# Patient Record
Sex: FEMALE | Race: WHITE
[De-identification: names, ages, dates, MRNs, and addresses within clinical notes are randomized per-mention and may not be internally consistent; named-entity substitution may affect disease eponyms.]

---

## 2017-03-03 ENCOUNTER — HOSPITAL ENCOUNTER (OUTPATIENT)
Dept: HOSPITAL 19 - ZLAB.WCH | Age: 27
End: 2017-03-03

## 2017-03-03 DIAGNOSIS — Z01.89: Primary | ICD-10-CM

## 2017-03-04 ENCOUNTER — HOSPITAL ENCOUNTER (EMERGENCY)
Dept: HOSPITAL 19 - COL.ER | Age: 27
Discharge: HOME | End: 2017-03-04
Payer: OTHER GOVERNMENT

## 2017-03-04 VITALS — TEMPERATURE: 98.2 F | HEART RATE: 107 BPM | SYSTOLIC BLOOD PRESSURE: 119 MMHG | DIASTOLIC BLOOD PRESSURE: 83 MMHG

## 2017-03-04 VITALS — HEIGHT: 60 IN | WEIGHT: 112.22 LBS | BODY MASS INDEX: 22.03 KG/M2

## 2017-03-04 DIAGNOSIS — R33.9: ICD-10-CM

## 2017-03-04 DIAGNOSIS — T83.021A: Primary | ICD-10-CM

## 2017-04-15 ENCOUNTER — HOSPITAL ENCOUNTER (EMERGENCY)
Dept: HOSPITAL 19 - COL.ER | Age: 27
LOS: 1 days | Discharge: HOME | End: 2017-04-16
Payer: OTHER GOVERNMENT

## 2017-04-15 VITALS — BODY MASS INDEX: 18.66 KG/M2 | WEIGHT: 111.99 LBS | HEIGHT: 65 IN

## 2017-04-15 VITALS — TEMPERATURE: 98.6 F

## 2017-04-15 DIAGNOSIS — R10.13: Primary | ICD-10-CM

## 2017-04-15 DIAGNOSIS — R11.2: ICD-10-CM

## 2017-04-15 DIAGNOSIS — R63.0: ICD-10-CM

## 2017-04-15 PROCEDURE — C9113 INJ PANTOPRAZOLE SODIUM, VIA: HCPCS

## 2017-04-16 VITALS — DIASTOLIC BLOOD PRESSURE: 72 MMHG | HEART RATE: 87 BPM | SYSTOLIC BLOOD PRESSURE: 115 MMHG

## 2017-04-16 LAB
ADJUSTED CALCIUM: 9.2 MG/DL (ref 8.4–10.2)
ALBUMIN SERPL-MCNC: 4.8 GM/DL (ref 3.5–5)
ALP SERPL-CCNC: 102 U/L (ref 50–136)
ALT SERPL-CCNC: 24 U/L (ref 9–52)
ANION GAP SERPL CALC-SCNC: 13 MMOL/L (ref 7–16)
BASOPHILS # BLD: 0.1 10*3/UL (ref 0–0.2)
BASOPHILS NFR BLD AUTO: 0.5 % (ref 0–2)
BILIRUB SERPL-MCNC: 0.4 MG/DL (ref 0–1)
BUN SERPL-MCNC: 9 MG/DL (ref 7–17)
CALCIUM SERPL-MCNC: 9.8 MG/DL (ref 8.4–10.2)
CHLORIDE SERPL-SCNC: 105 MMOL/L (ref 98–107)
CO2 SERPL-SCNC: 22 MMOL/L (ref 22–30)
CREAT SERPL-SCNC: 0.58 MG/DL (ref 0.52–1.25)
CRP SERPL-MCNC: < 0.5 MG/DL (ref 0–0.9)
EOSINOPHIL # BLD: 0.1 10*3/UL (ref 0–0.7)
EOSINOPHIL NFR BLD: 1.3 % (ref 0–4)
ERYTHROCYTE [DISTWIDTH] IN BLOOD BY AUTOMATED COUNT: 12.4 % (ref 11.5–14.5)
GLUCOSE SERPL-MCNC: 128 MG/DL (ref 74–106)
GRANULOCYTES # BLD AUTO: 63.9 % (ref 42.2–75.2)
HCT VFR BLD AUTO: 40.2 % (ref 37–47)
HGB BLD-MCNC: 13.8 G/DL (ref 12.5–16)
LIPASE SERPL-CCNC: 110 U/L (ref 23–300)
LYMPHOCYTES # BLD: 2.7 10*3/UL (ref 1.2–3.4)
LYMPHOCYTES NFR BLD: 25.2 % (ref 20–51)
MCH RBC QN AUTO: 31 PG (ref 27–31)
MCHC RBC AUTO-ENTMCNC: 34 G/DL (ref 33–37)
MCV RBC AUTO: 89 FL (ref 80–100)
MONOCYTES # BLD: 0.9 10*3/UL (ref 0.1–0.6)
MONOCYTES NFR BLD AUTO: 8.7 % (ref 1.7–9.3)
NEUTROPHILS # BLD: 6.7 10*3/UL (ref 1.4–6.5)
PLATELET # BLD AUTO: 235 K/MM3 (ref 130–400)
PMV BLD AUTO: 10.5 FL (ref 7.4–10.4)
POTASSIUM SERPL-SCNC: 3.8 MMOL/L (ref 3.4–5)
PROT SERPL-MCNC: 7.8 GM/DL (ref 6.4–8.2)
RBC # BLD AUTO: 4.52 M/MM3 (ref 4.1–5.3)
SODIUM SERPL-SCNC: 140 MMOL/L (ref 137–145)
WBC # BLD AUTO: 10.5 K/MM3 (ref 4.8–10.8)

## 2017-10-24 ENCOUNTER — HOSPITAL ENCOUNTER (OUTPATIENT)
Dept: HOSPITAL 19 - MHCPAIN | Age: 27
End: 2017-10-24
Attending: ANESTHESIOLOGY
Payer: OTHER GOVERNMENT

## 2017-10-24 DIAGNOSIS — G89.29: Primary | ICD-10-CM

## 2017-10-24 DIAGNOSIS — F17.210: ICD-10-CM

## 2017-10-24 DIAGNOSIS — M47.27: ICD-10-CM

## 2017-10-24 PROCEDURE — G0463 HOSPITAL OUTPT CLINIC VISIT: HCPCS

## 2019-06-10 ENCOUNTER — HOSPITAL ENCOUNTER (EMERGENCY)
Dept: HOSPITAL 61 - ER | Age: 29
Discharge: HOME | End: 2019-06-10
Payer: SELF-PAY

## 2019-06-10 VITALS — SYSTOLIC BLOOD PRESSURE: 100 MMHG | DIASTOLIC BLOOD PRESSURE: 60 MMHG

## 2019-06-10 VITALS — BODY MASS INDEX: 23.16 KG/M2 | WEIGHT: 118 LBS | HEIGHT: 60 IN

## 2019-06-10 DIAGNOSIS — Z90.710: ICD-10-CM

## 2019-06-10 DIAGNOSIS — N39.0: ICD-10-CM

## 2019-06-10 DIAGNOSIS — N83.202: Primary | ICD-10-CM

## 2019-06-10 LAB
ALBUMIN SERPL-MCNC: 4.4 G/DL (ref 3.4–5)
ALBUMIN/GLOB SERPL: 1.3 {RATIO} (ref 1–1.7)
ALP SERPL-CCNC: 105 U/L (ref 46–116)
ALT SERPL-CCNC: 23 U/L (ref 14–59)
ANION GAP SERPL CALC-SCNC: 10 MMOL/L (ref 6–14)
APTT PPP: YELLOW S
AST SERPL-CCNC: 19 U/L (ref 15–37)
BACTERIA #/AREA URNS HPF: (no result) /HPF
BASOPHILS # BLD AUTO: 0 X10^3/UL (ref 0–0.2)
BASOPHILS NFR BLD: 1 % (ref 0–3)
BILIRUB SERPL-MCNC: 0.3 MG/DL (ref 0.2–1)
BILIRUB UR QL STRIP: NEGATIVE
BUN SERPL-MCNC: 9 MG/DL (ref 7–20)
BUN/CREAT SERPL: 11 (ref 6–20)
CALCIUM SERPL-MCNC: 9.6 MG/DL (ref 8.5–10.1)
CHLORIDE SERPL-SCNC: 103 MMOL/L (ref 98–107)
CO2 SERPL-SCNC: 27 MMOL/L (ref 21–32)
CREAT SERPL-MCNC: 0.8 MG/DL (ref 0.6–1)
EOSINOPHIL NFR BLD: 0 X10^3/UL (ref 0–0.7)
EOSINOPHIL NFR BLD: 1 % (ref 0–3)
ERYTHROCYTE [DISTWIDTH] IN BLOOD BY AUTOMATED COUNT: 12.5 % (ref 11.5–14.5)
FIBRINOGEN PPP-MCNC: CLEAR MG/DL
GFR SERPLBLD BASED ON 1.73 SQ M-ARVRAT: 84.2 ML/MIN
GLOBULIN SER-MCNC: 3.4 G/DL (ref 2.2–3.8)
GLUCOSE SERPL-MCNC: 94 MG/DL (ref 70–99)
HCT VFR BLD CALC: 41.3 % (ref 36–47)
HGB BLD-MCNC: 14.1 G/DL (ref 12–15.5)
HYALINE CASTS #/AREA URNS LPF: (no result) /HPF
LIPASE: 155 U/L (ref 73–393)
LYMPHOCYTES # BLD: 2 X10^3/UL (ref 1–4.8)
LYMPHOCYTES NFR BLD AUTO: 26 % (ref 24–48)
MCH RBC QN AUTO: 32 PG (ref 25–35)
MCHC RBC AUTO-ENTMCNC: 34 G/DL (ref 31–37)
MCV RBC AUTO: 93 FL (ref 79–100)
MONO #: 0.7 X10^3/UL (ref 0–1.1)
MONOCYTES NFR BLD: 9 % (ref 0–9)
NEUT #: 5 X10^3UL (ref 1.8–7.7)
NEUTROPHILS NFR BLD AUTO: 64 % (ref 31–73)
NITRITE UR QL STRIP: NEGATIVE
PH UR STRIP: 6 [PH]
PLATELET # BLD AUTO: 314 X10^3/UL (ref 140–400)
POTASSIUM SERPL-SCNC: 3.6 MMOL/L (ref 3.5–5.1)
PROT SERPL-MCNC: 7.8 G/DL (ref 6.4–8.2)
PROT UR STRIP-MCNC: NEGATIVE MG/DL
RBC # BLD AUTO: 4.43 X10^6/UL (ref 3.5–5.4)
RBC #/AREA URNS HPF: 0 /HPF (ref 0–2)
SODIUM SERPL-SCNC: 140 MMOL/L (ref 136–145)
SQUAMOUS #/AREA URNS LPF: (no result) /LPF
UROBILINOGEN UR-MCNC: 0.2 MG/DL
WBC # BLD AUTO: 7.8 X10^3/UL (ref 4–11)
WBC #/AREA URNS HPF: (no result) /HPF (ref 0–4)

## 2019-06-10 PROCEDURE — 96374 THER/PROPH/DIAG INJ IV PUSH: CPT

## 2019-06-10 PROCEDURE — 76856 US EXAM PELVIC COMPLETE: CPT

## 2019-06-10 PROCEDURE — 76830 TRANSVAGINAL US NON-OB: CPT

## 2019-06-10 PROCEDURE — 81001 URINALYSIS AUTO W/SCOPE: CPT

## 2019-06-10 PROCEDURE — 80053 COMPREHEN METABOLIC PANEL: CPT

## 2019-06-10 PROCEDURE — 85025 COMPLETE CBC W/AUTO DIFF WBC: CPT

## 2019-06-10 PROCEDURE — 74176 CT ABD & PELVIS W/O CONTRAST: CPT

## 2019-06-10 PROCEDURE — 81025 URINE PREGNANCY TEST: CPT

## 2019-06-10 PROCEDURE — 96376 TX/PRO/DX INJ SAME DRUG ADON: CPT

## 2019-06-10 PROCEDURE — 96375 TX/PRO/DX INJ NEW DRUG ADDON: CPT

## 2019-06-10 PROCEDURE — 99284 EMERGENCY DEPT VISIT MOD MDM: CPT

## 2019-06-10 PROCEDURE — 83690 ASSAY OF LIPASE: CPT

## 2019-06-10 PROCEDURE — 36415 COLL VENOUS BLD VENIPUNCTURE: CPT

## 2019-06-10 NOTE — RAD
Pelvic ultrasound, 6/10/2019:

 

HISTORY: Pelvic mass, abnormal CT study, pelvic pain

 

Transabdominal and transvaginal scans were obtained.

 

The uterus is surgically absent. No ovaries were visualized on the 

transvaginal scans. 

 

The right ovary measures 3.4 x 1.5 x 3.1 cm. It contains a couple of tiny 

follicular cysts. There is blood flow in the right ovary. 

 

The left ovary measures 3.6 x 2.6 x 4.2 cm. It contains a smooth rounded 

2.5 cm nonvascular mass. This mass contains anechoic and isoechoic 

components. This probably represents a complicated cyst such as a 

hemorrhagic cyst. There is blood flow in the left ovary surrounding this 

nodule. There is a small amount of free fluid evident in the left adnexal 

region adjacent to the ovary. The adnexal regions are otherwise 

unremarkable.

 

 

IMPRESSION:

1. Status post hysterectomy.

2. Small complex cyst in the left ovary. Sonographic follow-up is 

suggested.

3. Small amount of free fluid in the left pelvis.

 

Electronically signed by: Rick Moritz, MD (6/10/2019 1:44 PM) [facility]

## 2019-06-10 NOTE — RAD
EXAM: CT Abdomen and Pelvis without IV contrast

 

CLINICAL HISTORY: Diffuse lower abdominal pain. 

 

COMPARISON: none

 

TECHNIQUE: Helical CT of the abdomen and pelvis without intravenous 

contrast. Axial, coronal and sagittal reformatted images were generated.

 

PQRS compliance statement - One or more of the following individualized 

dose reduction techniques were utilized for this study:

1.  Automated exposure control

2.  Adjustment of the mA and/or kV according to patient size

3.  Use of iterative reconstruction technique

 

FINDINGS:

Lack of intravenous contrast limits evaluation of solid organs, 

vasculature, and lymph nodes. 

 

Lower chest: 

Lung bases are clear.

 

Abdomen and Pelvis: 

No focal liver lesion. Liver measures 17.4 cm in length. Accounting for 

postcholecystectomy state, no biliary ductal dilatation. Calcified 

granuloma are seen within the spleen.

 

Adrenal glands and pancreas are unremarkable.

 

No renal tract calculus. No focal renal lesion. No hydronephrosis. Mild 

diffuse thickening of the bladder wall may be seen with cystitis.

 

Appendix is not seen. Moderate colonic stool content is seen. No small or 

large bowel dilatation. No evidence for bowel obstruction. Right lower 

quadrant surgical clips are seen. 

 

A 4 x 3.1 cm nodular density is seen in the left adnexa, adjacent to the 

left external iliac vessels. No abdominal or pelvic lymphadenopathy. No 

abdominal or pelvic ascites.

 

Bones: 

Osseous structures are grossly unremarkable.

 

IMPRESSION: 

1.  Diffuse bladder wall thickening may be seen with cystitis.

2.  No renal tract calculus.

3.  4 cm left adnexal nodular density with high density component to 

represent hemorrhagic cyst. This can be further assessed by pelvic 

ultrasound.

4.  Appendix is not seen.

 

Electronically signed by: Sherwin Edward MD (6/10/2019 11:14 AM) [id number]

## 2019-06-10 NOTE — PHYS DOC
Adult General


Chief Complaint


Chief Complaint:  PELVIC PAIN





HPI


HPI





Patient is a 29  year old female with a history of uterine cancer in 2009 

presents to the ED complaining of right lower abdominal pain 3 days ago. Descri

bes the pain as sharp. Rates the pain as 9 out of 10. States she took otc pain 

medications without relief. Denies vaginal discharge/bleeding, dysuria, 

hematuria, injury, diarrhea, nausea/vomiting, chest pain, shortness of breath, 

STD exposure or fever.





Review of Systems


Review of Systems





Constitutional: Denies fever or chills []


Eyes: Denies change in visual acuity, redness, or eye pain []


HENT: Denies nasal congestion or sore throat []


Respiratory: Denies cough or shortness of breath []


Cardiovascular: No additional information not addressed in HPI []


GI: Complains of abdominal pain. Denies nausea, vomiting, bloody stools or diar

jasvir []


: Denies dysuria or hematuria []


Musculoskeletal: Denies back pain or joint pain []


Integument: Denies rash or skin lesions []


Neurologic: Denies headache, focal weakness or sensory changes []





All other systems were reviewed and found to be within normal limits, except as 

documented in this note.





Current Medications


Current Medications





Current Medications








 Medications


  (Trade)  Dose


 Ordered  Sig/Myla  Start Time


 Stop Time Status Last Admin


Dose Admin


 


 Ketorolac


 Tromethamine


  (Toradol 30mg


 Vial)  30 mg  1X  ONCE  6/10/19 13:30


 6/10/19 13:31 DC 6/10/19 13:48


30 MG


 


 Morphine Sulfate


  (Morphine


 Sulfate)  4 mg  1X  ONCE  6/10/19 12:15


 6/10/19 12:16 DC 6/10/19 12:10


4 MG


 


 Ondansetron HCl


  (Zofran)  4 mg  1X  ONCE  6/10/19 10:45


 6/10/19 10:57 DC 6/10/19 11:14


4 MG











Allergies


Allergies





Allergies








Coded Allergies Type Severity Reaction Last Updated Verified


 


  No Known Drug Allergies    6/10/19 No











Physical Exam


Physical Exam





Constitutional: Well developed, well nourished, no acute distress, non-toxic 

appearance. []


HENT: Normocephalic, atraumatic


Eyes: PERRLA, EOMI, conjunctiva normal, no discharge. [] 


Neck: Normal range of motion, no tenderness, supple, no stridor. [] 


Cardiovascular:Heart rate regular rhythm, no murmur []


Lungs & Thorax:  Bilateral breath sounds clear to auscultation []


Abdomen: Bowel sounds normal, soft, mild diffuse lower abdominal tenderness, no 

masses, no pulsatile masses. [] 


: refused.


Skin: Warm, dry, no erythema, no rash. [] 


Back: No tenderness, no CVA tenderness. [] 


Extremities: No tenderness, no cyanosis, no clubbing, ROM intact, no edema. [] 


Neurologic: Alert and oriented X 3, normal motor function, normal sensory 

function, no focal deficits noted. []


Psychologic: Affect normal, judgement normal, mood normal. []





Current Patient Data


Vital Signs





                                   Vital Signs








  Date Time  Temp Pulse Resp B/P (MAP) Pulse Ox O2 Delivery O2 Flow Rate FiO2


 


6/10/19 14:15  86 16 100/60 (73) 98 Room Air  


 


6/10/19 10:46 98.2       





 98.2       








Lab Values





                                Laboratory Tests








Test


 6/10/19


10:40 6/10/19


10:42 6/10/19


11:15 6/10/19


11:50


 


Urine Collection Type Unknown     


 


Urine Color Yellow     


 


Urine Clarity Clear     


 


Urine pH 6.0     


 


Urine Specific Gravity >=1.030     


 


Urine Protein


 Negative mg/dL


(NEG-TRACE) 


 


 





 


Urine Glucose (UA)


 Negative mg/dL


(NEG) 


 


 





 


Urine Ketones (Stick)


 Negative mg/dL


(NEG) 


 


 





 


Urine Blood


 Negative (NEG)


 


 


 





 


Urine Nitrite


 Negative (NEG)


 


 


 





 


Urine Bilirubin


 Negative (NEG)


 


 


 





 


Urine Urobilinogen Dipstick


 0.2 mg/dL (0.2


mg/dL) 


 


 





 


Urine Leukocyte Esterase Trace (NEG)     


 


Urine RBC 0 /HPF (0-2)     


 


Urine WBC


 5-10 /HPF


(0-4) 


 


 





 


Urine Squamous Epithelial


Cells Many /LPF  


 


 


 





 


Urine Bacteria


 Mod /HPF


(0-FEW) 


 


 





 


Urine Hyaline Casts Few /HPF     


 


POC Urine HCG, Qualitative


 


 Hcg negative


(Negative) 


 





 


White Blood Count


 


 


 7.8 x10^3/uL


(4.0-11.0) 





 


Red Blood Count


 


 


 4.43 x10^6/uL


(3.50-5.40) 





 


Hemoglobin


 


 


 14.1 g/dL


(12.0-15.5) 





 


Hematocrit


 


 


 41.3 %


(36.0-47.0) 





 


Mean Corpuscular Volume


 


 


 93 fL ()


 





 


Mean Corpuscular Hemoglobin   32 pg (25-35)   


 


Mean Corpuscular Hemoglobin


Concent 


 


 34 g/dL


(31-37) 





 


Red Cell Distribution Width


 


 


 12.5 %


(11.5-14.5) 





 


Platelet Count


 


 


 314 x10^3/uL


(140-400) 





 


Neutrophils (%) (Auto)   64 % (31-73)   


 


Lymphocytes (%) (Auto)   26 % (24-48)   


 


Monocytes (%) (Auto)   9 % (0-9)   


 


Eosinophils (%) (Auto)   1 % (0-3)   


 


Basophils (%) (Auto)   1 % (0-3)   


 


Neutrophils # (Auto)


 


 


 5.0 x10^3uL


(1.8-7.7) 





 


Lymphocytes # (Auto)


 


 


 2.0 x10^3/uL


(1.0-4.8) 





 


Monocytes # (Auto)


 


 


 0.7 x10^3/uL


(0.0-1.1) 





 


Eosinophils # (Auto)


 


 


 0.0 x10^3/uL


(0.0-0.7) 





 


Basophils # (Auto)


 


 


 0.0 x10^3/uL


(0.0-0.2) 





 


Sodium Level


 


 


 


 140 mmol/L


(136-145)


 


Potassium Level


 


 


 


 3.6 mmol/L


(3.5-5.1)


 


Chloride Level


 


 


 


 103 mmol/L


()


 


Carbon Dioxide Level


 


 


 


 27 mmol/L


(21-32)


 


Anion Gap    10 (6-14)  


 


Blood Urea Nitrogen


 


 


 


 9 mg/dL (7-20)





 


Creatinine


 


 


 


 0.8 mg/dL


(0.6-1.0)


 


Estimated GFR


(Cockcroft-Gault) 


 


 


 84.2  





 


BUN/Creatinine Ratio    11 (6-20)  


 


Glucose Level


 


 


 


 94 mg/dL


(70-99)


 


Calcium Level


 


 


 


 9.6 mg/dL


(8.5-10.1)


 


Total Bilirubin


 


 


 


 0.3 mg/dL


(0.2-1.0)


 


Aspartate Amino Transferase


(AST) 


 


 


 19 U/L (15-37)





 


Alanine Aminotransferase (ALT)


 


 


 


 23 U/L (14-59)





 


Alkaline Phosphatase


 


 


 


 105 U/L


()


 


Total Protein


 


 


 


 7.8 g/dL


(6.4-8.2)


 


Albumin


 


 


 


 4.4 g/dL


(3.4-5.0)


 


Albumin/Globulin Ratio    1.3 (1.0-1.7)  


 


Lipase


 


 


 


 155 U/L


()





                                Laboratory Tests


6/10/19 11:15








                                Laboratory Tests


6/10/19 11:50











EKG


EKG


[]





Radiology/Procedures


Radiology/Procedures


[]PROCEDURE: CT ABDOMEN PELVIS WO CONTRAST





EXAM: CT Abdomen and Pelvis without IV contrast


 


CLINICAL HISTORY: Diffuse lower abdominal pain. 


 


COMPARISON: none


 


TECHNIQUE: Helical CT of the abdomen and pelvis without intravenous 


contrast. Axial, coronal and sagittal reformatted images were generated.


 


PQRS compliance statement - One or more of the following individualized 


dose reduction techniques were utilized for this study:


1.  Automated exposure control


2.  Adjustment of the mA and/or kV according to patient size


3.  Use of iterative reconstruction technique


 


FINDINGS:


Lack of intravenous contrast limits evaluation of solid organs, 


vasculature, and lymph nodes. 


 


Lower chest: 


Lung bases are clear.


 


Abdomen and Pelvis: 


No focal liver lesion. Liver measures 17.4 cm in length. Accounting for 


postcholecystectomy state, no biliary ductal dilatation. Calcified 


granuloma are seen within the spleen.


 


Adrenal glands and pancreas are unremarkable.


 


No renal tract calculus. No focal renal lesion. No hydronephrosis. Mild 


diffuse thickening of the bladder wall may be seen with cystitis.


 


Appendix is not seen. Moderate colonic stool content is seen. No small or 


large bowel dilatation. No evidence for bowel obstruction. Right lower 


quadrant surgical clips are seen. 


 


A 4 x 3.1 cm nodular density is seen in the left adnexa, adjacent to the 


left external iliac vessels. No abdominal or pelvic lymphadenopathy. No 


abdominal or pelvic ascites.


 


Bones: 


Osseous structures are grossly unremarkable.


 


IMPRESSION: 


1.  Diffuse bladder wall thickening may be seen with cystitis.


2.  No renal tract calculus.


3.  4 cm left adnexal nodular density with high density component to 


represent hemorrhagic cyst. This can be further assessed by pelvic 


ultrasound.


4.  Appendix is not seen.








PROCEDURE: PELVIS W/TV





Pelvic ultrasound, 6/10/2019:


 


HISTORY: Pelvic mass, abnormal CT study, pelvic pain


 


Transabdominal and transvaginal scans were obtained.


 


The uterus is surgically absent. No ovaries were visualized on the 


transvaginal scans. 


 


The right ovary measures 3.4 x 1.5 x 3.1 cm. It contains a couple of tiny 


follicular cysts. There is blood flow in the right ovary. 


 


The left ovary measures 3.6 x 2.6 x 4.2 cm. It contains a smooth rounded 


2.5 cm nonvascular mass. This mass contains anechoic and isoechoic 


components. This probably represents a complicated cyst such as a 


hemorrhagic cyst. There is blood flow in the left ovary surrounding this 


nodule. There is a small amount of free fluid evident in the left adnexal 


region adjacent to the ovary. The adnexal regions are otherwise 


unremarkable.


 


 


IMPRESSION:


1. Status post hysterectomy.


2. Small complex cyst in the left ovary. Sonographic follow-up is 


suggested.


3. Small amount of free fluid in the left pelvis.





Course & Med Decision Making


Course & Med Decision Making


Pertinent Labs and Imaging studies reviewed. (See chart for details)





[]Discussed lab and imaging findings with patient. Patient's pain improved in 

the ED. On reexamination, abdomen is soft nontender nondistended. No peritoneal 

signs. Tolerating by mouth. Discussed symptomatic treatment outpatient. 

Discussed follow-up with OB/GYN this week. Provided contact information/

education. Discussed reasons to return to the ED. Patient understands and agrees

 with plan.





Dragon Disclaimer


Dragon Disclaimer


This electronic medical record was generated, in whole or in part, using a voice

 recognition dictation system.





Departure


Departure


Impression:  


   Primary Impression:  


   Hemorrhagic ovarian cyst


   Additional Impression:  


   Urinary tract infection


Disposition:  01 HOME, SELF-CARE


Condition:  IMPROVED


Referrals:  


PARISA BARLOW Jr, MD


Patient Instructions:  Ovarian Cyst, Urinary Tract Infection


Scripts


Ciprofloxacin Hcl (CIPRO) 500 Mg Tablet


1 TAB PO BID, #20 TAB


   Prov: NELLY MELGOZA         6/10/19 


Naproxen (NAPROSYN) 500 Mg Tablet


1 TAB PO BID, #30 TAB 0 Refills


   Prov: NELLY MELGOZA         6/10/19





Problem Qualifiers











NELLY MELGOZA        Didier 10, 2019 10:49